# Patient Record
(demographics unavailable — no encounter records)

---

## 2024-11-12 NOTE — HISTORY OF PRESENT ILLNESS
[de-identified] : 15 year old female  ([employer],  soccer )   Left knee pain since 9/29/24 when pt. planted cleat into turf, felt a 'pop' and knee buckjled and gave out The pain is located  lateral, posterior and deep The pain is associated with  swelling, instabiliity,m buckling Worse with weight bearing activity and better at rest. Has tried Aleve, icing  10/1/24 - had asp, icing, mod activity, had mri 11/12/24 - doing for PT at [facility] in [place], [facility] activity

## 2024-11-12 NOTE — ASSESSMENT
[FreeTextEntry1] : mri left knee 9/30/24 - evid lat patella dislocaiton event with BB, mpfl sprain, eff, synov, TT-TG 10mm   - We discussed their diagnosis and treatment options at length including the risks and benefits of both surgical and non-surgical options. Surgical risks include but are not limited to pain, infection, bleeding, vascular injury, numbness, tingling, nerve damage. - discussed r/b/a to L knee MPFLR at [facility] with patient and parent - Due to risks of surgery, they will continue conservative treatment with PT, icing, and anti-inflammatory medications. - PT for quad/VMO strengthening and progress to dynamic core and glut exercises - The patient was provided with a prescription for Physical Therapy. - The patient is to continue home exercises learned at physical therapy. - The patient was advised to let pain guide the gradual advancement of activities. - If they do not make an appropriate improvement with conservative treatment they will consider surgical intervention in the future. - Follow up in 6 weeks to re-evaluate.

## 2024-11-12 NOTE — IMAGING
[de-identified] : LEFT KNEE Inspection:  mild effusion Palpation: med facet of patella tenderness, Knee Range of Motion:  0-130 Strength: 5/5 Quadriceps strength, 5/5 Hamstring strength Neurological: light touch is intact throughout Ligament Stability and Special Tests:  McMurrays: neg Lachman: neg Pivot Shift: neg Posterior Drawer: neg Valgus: neg Varus: neg Patella Apprehension: neg Patella Maltracking: positive

## 2024-12-03 NOTE — IMAGING
[de-identified] : LEFT KNEE Inspection:  mild effusion Palpation: med facet of patella tenderness, Knee Range of Motion:  0-130 Strength: 5/5 Quadriceps strength, 5/5 Hamstring strength Neurological: light touch is intact throughout Ligament Stability and Special Tests:  McMurrays: neg Lachman: neg Pivot Shift: neg Posterior Drawer: neg Valgus: neg Varus: neg Patella Apprehension: neg Patella Maltracking: positive

## 2024-12-03 NOTE — HISTORY OF PRESENT ILLNESS
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 planted cleat into turf, felt a 'pop' and knee buckjled and gave out The pain is located  lateral, posterior and deep The pain is associated with  swelling, instabiliity,m buckling Worse with weight bearing activity and better at rest. Has tried Aleve, icing  10/1/24 - had asp, icing, mod activity, had mri 11/12/24 - doing for PT at [facility] in [place], mod activity 12/3/24 - cont with PT and HEP and improving

## 2024-12-03 NOTE — ASSESSMENT
[FreeTextEntry1] : mri left knee 9/30/24 - evid lat patella dislocaiton event with BB, mpfl sprain, eff, synov, TT-TG 10mm   - We discussed their diagnosis and treatment options at length including the risks and benefits of both surgical and non-surgical options. Surgical risks include but are not limited to pain, infection, bleeding, vascular injury, numbness, tingling, nerve damage. - discussed r/b/a to L knee MPFLR at [facility] with patient and parent - Due to risks of surgery, they will continue conservative treatment with PT, icing, and anti-inflammatory medications. - PT for quad/VMO strengthening and progress to dynamic core and glut exercises - The patient was provided with a prescription for Physical Therapy. - The patient is to continue home exercises learned at physical therapy. - If they do not make an appropriate improvement with conservative treatment they will consider surgical intervention in the future. - The patient was advised to let pain guide the gradual advancement of activities.